# Patient Record
Sex: MALE | Race: WHITE | NOT HISPANIC OR LATINO | Employment: FULL TIME | URBAN - NONMETROPOLITAN AREA
[De-identification: names, ages, dates, MRNs, and addresses within clinical notes are randomized per-mention and may not be internally consistent; named-entity substitution may affect disease eponyms.]

---

## 2017-03-07 RX ORDER — CITALOPRAM 20 MG/1
TABLET ORAL
Qty: 90 TAB | Refills: 0 | Status: SHIPPED | OUTPATIENT
Start: 2017-03-07

## 2017-03-15 DIAGNOSIS — I10 ESSENTIAL HYPERTENSION: ICD-10-CM

## 2017-03-15 DIAGNOSIS — F43.10 PTSD (POST-TRAUMATIC STRESS DISORDER): ICD-10-CM

## 2017-03-15 RX ORDER — PRAZOSIN HYDROCHLORIDE 2 MG/1
CAPSULE ORAL
Qty: 90 CAP | Refills: 0 | Status: SHIPPED | OUTPATIENT
Start: 2017-03-15

## 2019-07-01 ENCOUNTER — OCCUPATIONAL MEDICINE (OUTPATIENT)
Dept: URGENT CARE | Facility: CLINIC | Age: 59
End: 2019-07-01
Payer: COMMERCIAL

## 2019-07-01 VITALS
OXYGEN SATURATION: 94 % | DIASTOLIC BLOOD PRESSURE: 96 MMHG | BODY MASS INDEX: 24.38 KG/M2 | RESPIRATION RATE: 14 BRPM | WEIGHT: 180 LBS | HEART RATE: 86 BPM | SYSTOLIC BLOOD PRESSURE: 154 MMHG | HEIGHT: 72 IN | TEMPERATURE: 97.9 F
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
## 2019-07-01 DIAGNOSIS — M25.561 ACUTE PAIN OF RIGHT KNEE: ICD-10-CM

## 2019-07-01 DIAGNOSIS — S86.911A KNEE STRAIN, RIGHT, INITIAL ENCOUNTER: ICD-10-CM

## 2019-07-01 PROCEDURE — 99213 OFFICE O/P EST LOW 20 MIN: CPT | Mod: 29 | Performed by: PHYSICIAN ASSISTANT

## 2019-07-01 RX ORDER — KETOROLAC TROMETHAMINE 30 MG/ML
60 INJECTION, SOLUTION INTRAMUSCULAR; INTRAVENOUS ONCE
Status: DISCONTINUED | OUTPATIENT
Start: 2019-07-01 | End: 2019-07-01

## 2019-07-01 NOTE — PROGRESS NOTES
Subjective:      Jose Angel Roger is a 58 y.o. male who presents with Knee Injury (right knee)      DOI 6/29/2019: Patient states that he slipped on the step of his truck injuring his right knee.  Patient states he felt a pop and pain.  He states that he tried to drive home and was having extreme pain when trying to push the pedals.  Patient was seen at [facility] emergency room x-rays were done and were negative for any fracture but did see a joint effusion in the right knee.      HPI  Patient presents for follow-up evaluation of work-related injury as described above.  Review of Systems   Musculoskeletal:        Right knee pain         PMH: No pertinent past medical history to this problem  MEDS: Medications were reviewed in Epic  ALLERGIES: Allergies were reviewed in Epic  SOCHX: Works as a  at [employer].   FH: No pertinent family history to this problem       Objective:     /96 (BP Location: Right arm, Patient Position: Sitting)   Pulse 86   Temp 36.6 °C (97.9 °F)   Resp 14   Ht 1.829 m (6')   Wt 81.6 kg (180 lb)   SpO2 94%   BMI 24.41 kg/m²      Physical Exam   Constitutional: Vital signs are normal. He appears well-developed and well-nourished. No distress.   HENT:   Head: Normocephalic and atraumatic.   Right Ear: Hearing normal.   Left Ear: Hearing normal.   Eyes: Pupils are equal, round, and reactive to light.   Cardiovascular: Normal rate, regular rhythm and normal heart sounds.    Pulmonary/Chest: Effort normal.   Musculoskeletal:   Right knee as described below   Neurological: He is alert. Coordination normal.   Skin: Skin is warm and dry.   Psychiatric: He has a normal mood and affect.   Nursing note and vitals reviewed.      Exam is limited secondary to patient's pain.  Patient has tenderness to palpation of the right medial aspect of the knee.  Patient has pain with plantar flexion and against resistance with extension of the right knee.  No joint laxity noted but  there is swelling and effusion in the medial aspect of the patient's right knee.  Negative anterior drawer test.       Assessment/Plan:   1. Knee strain, right, initial encounter  2. Fall, initial encounter  3. Acute pain of right knee  Offered patient Toradol shot today but patient declines and states he just wants to use ibuprofen and pain management as discussed at the emergency room which includes ibuprofen and ice.  Patient also to follow-up with primary care given his elevated blood pressure today.  Patient should avoid any activity involving his right knee including but not limited to driving until next follow-up appointment.  Agree with the ER recommendations of rest, ice, compression with brace and elevation.  Patient will follow-up on Friday, July 5 for reevaluation and possible referral to Ortho at that time if pain persists.  Patient is agreeable to plan.  D 39 completed today  Tom Guevara PA-C

## 2019-07-01 NOTE — LETTER
[facility]  [address] [place] [address]  Phone:  [phone] - Fax:      [facility] Progress Report and Disability Certification  Date of Service: 7/1/2019   No Show:  No  Date / Time of Next Visit: 7/5/2019   Claim Information   Patient Name: Jose Angel Roger  Claim Number:     Employer:    Date of Injury: 6/29/2019     Insurer / TPA: [employer]an[employer] [place]  ID / SSN:     Occupation:   Diagnosis: Diagnoses of Knee strain, right, initial encounter, Fall, initial encounter, and Acute pain of right knee were pertinent to this visit.    Medical Information   Related to Industrial Injury? Yes    Subjective Complaints:  DOI 6/29/2019: Patient states that he slipped on the step of his truck injuring his right knee.  Patient states he felt a pop and pain.  He states that he tried to drive home and was having extreme pain when trying to push the pedals.  Patient was seen at [facility] emergency room x-rays were done and were negative for any fracture but did see a joint effusion in the right knee.  Patient denies any previous knee injury or second job.  Patient reports to the urgent care today for evaluation after being seen at the emergency department this morning.   Objective Findings: Exam is limited secondary to patient's pain.  Patient has tenderness to palpation of the right medial aspect of the knee.  Patient has pain with plantar flexion and against resistance with extension of the right knee.  No joint laxity noted but there is swelling and effusion in the medial aspect of the patient's right knee.  Negative anterior drawer test.   Pre-Existing Condition(s):     Assessment:   Condition Same    Status: Additional Care Required  Permanent Disability:No    Plan:      Diagnostics:      Comments:       Disability Information   Status: Released to Restricted Duty    From:  7/1/2019  Through: 7/5/2019 Restrictions are: Temporary     Physical Restrictions   Sitting:    Standin hrs/day Stooping:    Bending:      Squatting:    Walkin hrs/day Climbing:    Pushing:      Pulling:    Other:    Reaching Above Shoulder (L):   Reaching Above Shoulder (R):       Reaching Below Shoulder (L):    Reaching Below Shoulder (R):      Not to exceed Weight Limits   Carrying(hrs):   Weight Limit(lb):   Lifting(hrs):   Weight  Limit(lb):     Comments: Patient should avoid any activity involving his right knee including but not limited to driving until next follow-up appointment.  Agree with the ER recommendations of rest, ice, compression with brace and elevation.  Patient will follow-up on  for reevaluation and possible referral to Ortho at that time if pain persists.    Repetitive Actions   Hands: i.e. Fine Manipulations from Grasping:     Feet: i.e. Operating Foot Controls:     Driving / Operate Machinery:     Physician Name: Giovanni Guevara P.A.-C. Physician Signature: GIOVANNI Elkins P.A.-C. e-Signature: Dr. Harsh Mir, Medical Director   Clinic Name / Location: [address] [address] Clinic Phone Number: Dept: [phone]   Appointment Time: 1:40 Pm Visit Start Time: 2:17 PM   Check-In Time:  2:10 Pm Visit Discharge Time:  03:01pm   Original-Treating Physician or Chiropractor    Page 2-Insurer/TPA    Page 3-Employer    Page 4-Employee     Yes - the patient is able to be screened

## 2019-07-05 ENCOUNTER — OCCUPATIONAL MEDICINE (OUTPATIENT)
Dept: URGENT CARE | Facility: CLINIC | Age: 59
End: 2019-07-05
Payer: COMMERCIAL

## 2019-07-05 VITALS
OXYGEN SATURATION: 96 % | RESPIRATION RATE: 14 BRPM | SYSTOLIC BLOOD PRESSURE: 132 MMHG | BODY MASS INDEX: 24.38 KG/M2 | WEIGHT: 180 LBS | TEMPERATURE: 98.1 F | HEART RATE: 99 BPM | DIASTOLIC BLOOD PRESSURE: 88 MMHG | HEIGHT: 72 IN

## 2019-07-05 DIAGNOSIS — M25.561 ACUTE PAIN OF RIGHT KNEE: ICD-10-CM

## 2019-07-05 DIAGNOSIS — S83.8X1D SPRAIN OF OTHER LIGAMENT OF RIGHT KNEE, SUBSEQUENT ENCOUNTER: ICD-10-CM

## 2019-07-05 PROCEDURE — 99213 OFFICE O/P EST LOW 20 MIN: CPT | Mod: 29 | Performed by: NURSE PRACTITIONER

## 2019-07-05 RX ORDER — TRAMADOL HYDROCHLORIDE 50 MG/1
50 TABLET ORAL EVERY 6 HOURS PRN
Qty: 20 TAB | Refills: 0 | Status: SHIPPED | OUTPATIENT
Start: 2019-07-05 | End: 2019-07-12

## 2019-07-05 NOTE — LETTER
[facility]  [address] [place] [address]  Phone:  [phone] - Fax:      [facility] Progress Report and Disability Certification  Date of Service: 2019   No Show:  No  Date / Time of Next Visit: 2019   Claim Information   Patient Name: Jose Angel Roger  Claim Number:     Employer:    Date of Injury: 2019     Insurer / TPA: Pranay [place]  ID / SSN:     Occupation:   Diagnosis: Diagnoses of Sprain of other ligament of right knee, subsequent encounter and Acute pain of right knee were pertinent to this visit.    Medical Information   Related to Industrial Injury? Yes    Subjective Complaints:  DOI: 19  Patient slipped on the step of his truck and injured his right knee.  Initially evaluated at the [facility] ER. X-rays were negative for acute fracture, and positive for joint effusion. Patient was then seen in urgent care on , still having significant pain.  Conservative treatment of rest, ice and ibuprofen and restrictions. Today is his third visit.    Objective Findings:     Pre-Existing Condition(s):     Assessment:   Condition Same    Status: Additional Care Required  Permanent Disability:No    Plan: Medication    Diagnostics: MRI    Comments:       Disability Information   Status: Released to Restricted Duty    From:  2019  Through: 2019 Restrictions are: Temporary   Physical Restrictions   Sitting:    Standin hrs/day Stooping:    Bending:      Squattin hrs/day Walkin hrs/day Climbin hrs/day Pushing:      Pulling:    Other:    Reaching Above Shoulder (L):   Reaching Above Shoulder (R):       Reaching Below Shoulder (L):    Reaching Below Shoulder (R):      Not to exceed Weight Limits   Carrying(hrs):   Weight Limit(lb):   Lifting(hrs):   Weight  Limit(lb):     Comments: Continue rest, ibuprofen, ice, elevation. MRI ordered. Referral given to occupational health  for follow up.     Repetitive Actions   Hands: i.e. Fine Manipulations from Grasping:     Feet: i.e. Operating Foot Controls:     Driving / Operate Machinery: 0 hrs/day   Physician Name: Cathey J Hamman, A.P.N. Physician Signature:   e-Signature: Dr. Harsh Mir, Medical Director   Clinic Name / Location: [address] [address] Clinic Phone Number: Dept: [phone]   Appointment Time: 11:00 Am Visit Start Time: 11:15 AM   Check-In Time:  10:59 Am Visit Discharge Time:  12:09 PM   Original-Treating Physician or Chiropractor    Page 2-Insurer/TPA    Page 3-Employer    Page 4-Employee

## 2019-07-05 NOTE — PROGRESS NOTES
Subjective:      Jose Angel Roger is a 58 y.o. male who presents with Knee Pain (right knee)      DOI: 6/29/19  Patient slipped on the step of his truck and injured his right knee.  Initially evaluated at the [facility] ER. X-rays were negative for acute fracture, and positive for joint effusion. Patient was then seen in urgent care on 7/1, still having significant pain.  Conservative treatment of rest, ice and ibuprofen and restrictions. Today is his third visit.      Knee Pain   This is a new problem. Episode onset: 6/29/19. The problem occurs constantly. The problem has been unchanged. Associated symptoms comments: Pain to the medial aspect of the right knee. Nothing aggravates the symptoms. He has tried nothing for the symptoms. The treatment provided no relief.       Review of Systems   Musculoskeletal:        Right knee pain, pain with weight bearing and pain with movement.    All other systems reviewed and are negative.         Objective:     /88 (BP Location: Left arm, Patient Position: Sitting)   Pulse 99   Temp 36.7 °C (98.1 °F)   Resp 14   Ht 1.829 m (6')   Wt 81.6 kg (180 lb)   SpO2 96%   BMI 24.41 kg/m²      Physical Exam   Constitutional: He appears well-developed and well-nourished.   Skin: Skin is dry.   Vitals reviewed.      There is point tenderness to the medial aspect of the right knee.  No obivous laxity of the knee joint. Pain is reproduced with full extension and flexion > 90 degrees.  No deformity. Antalgic gait.             Assessment/Plan:     1. Sprain of other ligament of right knee, subsequent encounter    - MR-KNEE-W/O RIGHT; Future  - REFERRAL TO RADIOLOGY  - tramadol (ULTRAM) 50 MG Tab; Take 1 Tab by mouth every 6 hours as needed for up to 10 days.  Dispense: 20 Tab; Refill: 0  - Consent for Opiate Prescription  -follow up with Dr. Sandhu in occupational health for the next visit; appointment made     2. Acute pain of right knee    - MR-KNEE-W/O RIGHT; Future  -  REFERRAL TO RADIOLOGY  - tramadol (ULTRAM) 50 MG Tab; Take 1 Tab by mouth every 6 hours as needed for up to 10 days.  Dispense: 20 Tab; Refill: 0  - Consent for Opiate Prescription  -follow up with Dr. Sandhu in occupational health for the next visit; appointment made

## 2019-07-11 ENCOUNTER — TELEPHONE (OUTPATIENT)
Dept: OCCUPATIONAL MEDICINE | Facility: CLINIC | Age: 59
End: 2019-07-11

## 2019-07-12 ENCOUNTER — OCCUPATIONAL MEDICINE (OUTPATIENT)
Dept: OCCUPATIONAL MEDICINE | Facility: CLINIC | Age: 59
End: 2019-07-12
Payer: COMMERCIAL

## 2019-07-12 VITALS
BODY MASS INDEX: 25.9 KG/M2 | WEIGHT: 185 LBS | HEIGHT: 71 IN | RESPIRATION RATE: 16 BRPM | TEMPERATURE: 98.6 F | HEART RATE: 112 BPM | OXYGEN SATURATION: 98 % | SYSTOLIC BLOOD PRESSURE: 120 MMHG | DIASTOLIC BLOOD PRESSURE: 84 MMHG

## 2019-07-12 DIAGNOSIS — R29.898 POPPING SOUND OF KNEE JOINT: ICD-10-CM

## 2019-07-12 DIAGNOSIS — S83.91XD SPRAIN OF RIGHT KNEE, SUBSEQUENT ENCOUNTER: ICD-10-CM

## 2019-07-12 PROCEDURE — 99213 OFFICE O/P EST LOW 20 MIN: CPT | Mod: 29 | Performed by: NURSE PRACTITIONER

## 2019-07-12 RX ORDER — TRAMADOL HYDROCHLORIDE 50 MG/1
50 TABLET ORAL EVERY 4 HOURS PRN
Qty: 30 TAB | Refills: 0 | Status: SHIPPED | OUTPATIENT
Start: 2019-07-12 | End: 2019-07-17

## 2019-07-12 RX ORDER — GABAPENTIN 300 MG/1
300 CAPSULE ORAL 3 TIMES DAILY
COMMUNITY

## 2019-07-12 ASSESSMENT — ENCOUNTER SYMPTOMS
MYALGIAS: 1
NEUROLOGICAL NEGATIVE: 1
CONSTITUTIONAL NEGATIVE: 1
RESPIRATORY NEGATIVE: 1

## 2019-07-12 NOTE — PROGRESS NOTES
"Subjective:      Jose Angel Roger is a 58 y.o. male who presents with Other (WC DOI 6/29/19 RT knee, feeling worse, room 16)      DOI: 6/29/19 Patient slipped on the step of his truck and injured his right knee.  Initially evaluated at the [facility] ER. X-rays were negative for acute fracture, and positive for joint effusion. Pt works on a concrete truck. Pt reports that he continues to hear popping in his Right knee every time he uses it. Swelling has gone down. Taking Tramadol and OTC Aleve. Pt states that the pain is a jabbing pain, denies radiating. Pain is 7/10 intermittent when he's elevating the knee. Any pressure applied while going to the bathroom or weight bearing causes throbbing.      Other   Associated symptoms include myalgias.       Review of Systems   Constitutional: Negative.    Respiratory: Negative.    Musculoskeletal: Positive for joint pain and myalgias.   Skin: Negative.    Neurological: Negative.          ROS: All systems were reviewed on intake form, form was reviewed and signed. See scanned documents in media. Pertinent positives and negatives included in HPI.    PMH: No pertinent past medical history to this problem  MEDS: Medications were reviewed in Epic  ALLERGIES:   Allergies   Allergen Reactions   • Lipitor [Atorvastatin] Swelling     Burning swelling of face   • Nkda [No Known Drug Allergy]      SOCHX: Works as  at [employer]   FH: No pertinent family history to this problem       Objective:     /84   Pulse (!) 112   Temp 37 °C (98.6 °F)   Resp 16   Ht 1.803 m (5' 11\")   Wt 83.9 kg (185 lb)   SpO2 98%   BMI 25.80 kg/m²      Physical Exam   Constitutional: He appears well-developed and well-nourished.   Cardiovascular: Normal rate.    Pulmonary/Chest: Effort normal and breath sounds normal.   Musculoskeletal: He exhibits tenderness.   Pain, popping, and decreased ROM at medial aspect of R Knee    Skin: Skin is warm and dry. Capillary refill takes less than " 2 seconds.       Right Knee  Pain to the medial aspect  Guarding  No edema or redness  Drawer test is positive  Popping pos  Neg clicking  Pain with flexion and extension       Assessment/Plan:     1. Sprain of right knee, subsequent encounter  2. Popping sound of knee joint    - REFERRAL TO PHYSICAL THERAPY Reason for Therapy: Eval/Treat/Report  - tramadol (ULTRAM) 50 MG Tab; Take 1 Tab by mouth every four hours as needed for up to 6 doses.  Dispense: 30 Tab; Refill: 0    MRI pending  Follow-up in 2 weeks   Continue with OTC Aleve as needed for pain   Apply heat and ice as tolerated   Work Restrictions applied

## 2019-07-12 NOTE — LETTER
[address],   [address] [address]  Phone:  [phone] - Fax:  [phone]   [facility] Progress Report and Disability Certification  Date of Service: 7/12/2019   No Show:  No  Date / Time of Next Visit: 7/26/2019 @ 11:30AM   Claim Information   Patient Name: Jose Angel Roger  Claim Number:     Employer:   [employer] Date of Injury:      Insurer / TPA: Pranay [place]  ID / SSN:     Occupation:   Diagnosis: Diagnoses of Sprain of right knee, subsequent encounter and Popping sound of knee joint were pertinent to this visit.    Medical Information   Related to Industrial Injury? Yes    Subjective Complaints:  DOI: 6/29/19 Patient slipped on the step of his truck and injured his right knee.  Initially evaluated at the [facility] ER. X-rays were negative for acute fracture, and positive for joint effusion. Pt works on a concrete truck. Pt reports that he continues to hear popping in his Right knee every time he uses it. Swelling has gone down. Taking Tramadol and OTC Aleve. Pt states that the pain is a jabbing pain, denies radiating. Pain is 7/10 intermittent when he's elevating the knee. Any pressure applied while going to the bathroom or weight bearing causes throbbing. Wearing a knee sleeve, that helps with swelling and pain during the day. Has difficulty driving   Objective Findings: Right Knee  Pain to the medial aspect  Guarding  No edema or redness  Drawer test is positive  Popping pos  Neg clicking  Pain with flexion and extension   Pre-Existing Condition(s):     Assessment:   Condition Same    Status: Additional Care Required  Permanent Disability:No    Plan:      Diagnostics:      Comments:  Follow-up in 2 weeks  PT referral  Work Restrictions applied   Take OTC Aleve as needed for pain  Apply heat and ice as tolerated  Use Tramadol 50mg as needed for pain    Disability Information   Status:  Released to Restricted Duty    From:  2019  Through: 2019 Restrictions are:     Physical Restrictions   Sitting:    Standing:  < or = to 1 hr/day Stooping:    Bending:      Squattin hrs/day Walking:  < or = to 1 hr/day Climbing:  < or = to 1 hr/day Pushing:      Pulling:    Other:    Reaching Above Shoulder (L):   Reaching Above Shoulder (R):       Reaching Below Shoulder (L):    Reaching Below Shoulder (R):      Not to exceed Weight Limits   Carrying(hrs):   Weight Limit(lb): < or = to 10 pounds Lifting(hrs):   Weight  Limit(lb): < or = to 10 pounds   Comments:      Repetitive Actions   Hands: i.e. Fine Manipulations from Grasping:     Feet: i.e. Operating Foot Controls:     Driving / Operate Machinery: < or = to 1 hr/day   Physician Name: Charles Sandhu D.O. Physician Signature: BIENVENIDO Vargas e-Signature: Dr. Harsh Mir, Medical Director   Clinic Name / Location: [address],   [address] [place] [address] Clinic Phone Number: Dept: [phone]   Appointment Time: 1:00 Pm Visit Start Time: 12:48 PM   Check-In Time:  12:28 Pm Visit Discharge Time: 1:41 PM   Original-Treating Physician or Chiropractor    Page 2-Insurer/TPA    Page 3-Employer    Page 4-Employee

## 2019-07-25 ENCOUNTER — TELEPHONE (OUTPATIENT)
Dept: OCCUPATIONAL MEDICINE | Facility: CLINIC | Age: 59
End: 2019-07-25

## 2019-07-26 ENCOUNTER — OCCUPATIONAL MEDICINE (OUTPATIENT)
Dept: OCCUPATIONAL MEDICINE | Facility: CLINIC | Age: 59
End: 2019-07-26
Payer: COMMERCIAL

## 2019-07-26 VITALS
HEIGHT: 72 IN | BODY MASS INDEX: 25.19 KG/M2 | OXYGEN SATURATION: 94 % | DIASTOLIC BLOOD PRESSURE: 68 MMHG | TEMPERATURE: 98.4 F | WEIGHT: 186 LBS | HEART RATE: 77 BPM | SYSTOLIC BLOOD PRESSURE: 118 MMHG

## 2019-07-26 DIAGNOSIS — S83.241D ACUTE MEDIAL MENISCUS TEAR OF RIGHT KNEE, SUBSEQUENT ENCOUNTER: ICD-10-CM

## 2019-07-26 DIAGNOSIS — R29.898 POPPING SOUND OF KNEE JOINT: ICD-10-CM

## 2019-07-26 DIAGNOSIS — S83.91XD SPRAIN OF RIGHT KNEE, SUBSEQUENT ENCOUNTER: ICD-10-CM

## 2019-07-26 PROCEDURE — 99213 OFFICE O/P EST LOW 20 MIN: CPT | Mod: 29 | Performed by: NURSE PRACTITIONER

## 2019-07-26 ASSESSMENT — ENCOUNTER SYMPTOMS
CONSTITUTIONAL NEGATIVE: 1
CARDIOVASCULAR NEGATIVE: 1
BACK PAIN: 1
NEUROLOGICAL NEGATIVE: 1
MYALGIAS: 1
RESPIRATORY NEGATIVE: 1

## 2019-07-26 NOTE — PROGRESS NOTES
Subjective:      Jose Angel Roger is a 58 y.o. male who presents with Other (WC DOI 6/29/19 RT knee, feeling the same, room 16)      DOI: 6/29/19 Patient slipped on the step of his truck and injured his right knee.  Initially evaluated at the [facility] ER. X-rays were negative for acute fracture, and positive for joint effusion.  Pt states there is no improvement. MRI results reviewed. Taking the Tramadol and Advil. Pt is compensating and now back is starting to hurt. Pt is resting his knee as much as possible. Pt is continuing to use his brace. Discussed plan of care. Pt agrees with plan of care at this time.     HPI    Review of Systems   Constitutional: Negative.    Respiratory: Negative.    Cardiovascular: Negative.    Musculoskeletal: Positive for back pain, joint pain and myalgias.   Skin: Negative.    Neurological: Negative.         SOCHX: Works as a  at  : No pertinent family history to this problem.         Objective:     /68   Pulse 77   Temp 36.9 °C (98.4 °F)   Ht 1.829 m (6')   Wt 84.4 kg (186 lb)   SpO2 94%   BMI 25.23 kg/m²      Physical Exam   Constitutional: He is oriented to person, place, and time. He appears well-developed and well-nourished.   Cardiovascular: Normal rate and regular rhythm.    Pulmonary/Chest: Effort normal.   Musculoskeletal: He exhibits edema and tenderness.        Right knee: He exhibits decreased range of motion, swelling and bony tenderness. Tenderness found. Medial joint line tenderness noted.   Neurological: He is alert and oriented to person, place, and time.   Skin: Skin is warm and dry. Capillary refill takes less than 2 seconds.   Psychiatric: He has a normal mood and affect.       Right Knee  Moderate pain to the medial aspect  Guarding  No edema or redness  Drawer test is positive  Popping pos  Neg clicking  Severe pain with flexion and extension    MRI 7/20/19:  Impression- Complex tearing of the body and posterior horn of the medial  meniscus with disruption of the posterior root ligament attachment. Moderate partial thickness medial compartment chondromalacia       Assessment/Plan:     1. Sprain of right knee, subsequent encounter    2. Popping sound of knee joint  3. Acute medial meniscus tear of right knee, subsequent encounter    - REFERRAL TO GENERAL SURGERY    Follow-up in 3 weeks   Keep PT appointment  Referral to Orthopedics sent  Surgery referral sent   Work Restrictions applied   Continue with OTC Aleve as needed for pain   Apply heat and ice as tolerated   Use Tramadol 50mg as needed for pain

## 2019-07-26 NOTE — LETTER
[address],   [address] [address]  Phone:  [phone] - Fax:  [phone]   [facility] Progress Report and Disability Certification  Date of Service: 7/26/2019   No Show:  No  Date / Time of Next Visit: 8/16/2019 @ 1pm   Claim Information   Patient Name: Jose Angel Roger  Claim Number:     Employer:   [employer] Date of Injury: 6/29/2019     Insurer / TPA: Pranay [place]  ID / SSN:     Occupation:   Diagnosis: Diagnoses of Sprain of right knee, subsequent encounter, Popping sound of knee joint, and Acute medial meniscus tear of right knee, subsequent encounter were pertinent to this visit.    Medical Information   Related to Industrial Injury? Yes    Subjective Complaints:  DOI: 6/29/19 Patient slipped on the step of his truck and injured his right knee.  Initially evaluated at the [facility] ER. X-rays were negative for acute fracture, and positive for joint effusion.  Pt states there is no improvement. MRI results reviewed. Taking the Tramadol and Advil. Pt is compensating and now back is starting to hurt. Pt is resting his knee as much as possible. Pt is continuing to use his brace.    Objective Findings: Right Knee  Moderate pain to the medial aspect  Guarding  No edema or redness  Drawer test is positive  Popping pos  Neg clicking  Severe pain with flexion and extension   Pre-Existing Condition(s):     Assessment:   Condition Same    Status: Additional Care Required  Permanent Disability:No    Plan:      Diagnostics:      Comments:  Follow-up in 3 weeks   PT referral pending   Surgery referral sent  Work Restrictions applied   Continue with OTC Aleve as needed for pain   Apply heat and ice as tolerated   Use Tramadol 50mg as needed for pain       Disability Information   Status: Released to Restricted Duty    From:  7/26/2019  Through: 8/16/2019 Restrictions are: Temporary   Physical Restrictions      Sitting:    Standing:  < or = to 1 hr/day Stooping:    Bending:      Squattin hrs/day Walking:  < or = to 1 hr/day Climbing:  < or = to 1 hr/day Pushing:      Pulling:    Other:    Reaching Above Shoulder (L):   Reaching Above Shoulder (R):       Reaching Below Shoulder (L):    Reaching Below Shoulder (R):      Not to exceed Weight Limits   Carrying(hrs):   Weight Limit(lb):   Comments:No lifting more than 5lbs Lifting(hrs):   Weight  Limit(lb):   Comments:No lifting more than 5lbs   Comments:      Repetitive Actions   Hands: i.e. Fine Manipulations from Grasping:     Feet: i.e. Operating Foot Controls: 0 hrs/day   Driving / Operate Machinery: 0 hrs/day   Physician Name: JM Meeks Physician Signature:   e-Signature: Dr. Harsh Mir, Medical Director   Clinic Name / Location: [address],   [address] [place] [address] Clinic Phone Number: Dept: [phone]   Appointment Time: 11:30 Am Visit Start Time: 11:22 AM   Check-In Time:  11:16 Am Visit Discharge Time:  12:30pm   Original-Treating Physician or Chiropractor    Page 2-Insurer/TPA    Page 3-Employer    Page 4-Employee

## 2019-08-01 ENCOUNTER — TELEPHONE (OUTPATIENT)
Dept: URGENT CARE | Facility: CLINIC | Age: 59
End: 2019-08-01

## 2019-08-01 NOTE — TELEPHONE ENCOUNTER
Patient had called asking about his referral of his hand surgery. He did stat he had multiple missed calls from a [facility] number but no VMs to call someone back. I went ahead and gave him the number to call where the referral went. He was happy for the help and had no further questions.

## 2019-08-15 ENCOUNTER — TELEPHONE (OUTPATIENT)
Dept: OCCUPATIONAL MEDICINE | Facility: CLINIC | Age: 59
End: 2019-08-15

## 2020-01-23 ENCOUNTER — OFFICE VISIT (OUTPATIENT)
Dept: OCCUPATIONAL MEDICINE | Facility: CLINIC | Age: 60
End: 2020-01-23

## 2020-01-23 VITALS
TEMPERATURE: 98.1 F | WEIGHT: 180 LBS | OXYGEN SATURATION: 96 % | BODY MASS INDEX: 24.38 KG/M2 | DIASTOLIC BLOOD PRESSURE: 86 MMHG | HEIGHT: 72 IN | SYSTOLIC BLOOD PRESSURE: 144 MMHG | HEART RATE: 86 BPM | RESPIRATION RATE: 16 BRPM

## 2020-01-23 DIAGNOSIS — Z02.4 ENCOUNTER FOR COMMERCIAL DRIVER MEDICAL EXAMINATION (CDME): ICD-10-CM

## 2020-01-23 PROCEDURE — 7100 PR DOT PHYSICAL: Performed by: PREVENTIVE MEDICINE

## 2020-07-24 ENCOUNTER — HOSPITAL ENCOUNTER (OUTPATIENT)
Dept: LAB | Facility: MEDICAL CENTER | Age: 60
End: 2020-07-24
Attending: FAMILY MEDICINE
Payer: COMMERCIAL

## 2020-07-24 LAB
25(OH)D3 SERPL-MCNC: 24 NG/ML (ref 30–100)
ALBUMIN SERPL BCP-MCNC: 4.7 G/DL (ref 3.2–4.9)
ALBUMIN/GLOB SERPL: 2 G/DL
ALP SERPL-CCNC: 68 U/L (ref 30–99)
ALT SERPL-CCNC: 12 U/L (ref 2–50)
ANION GAP SERPL CALC-SCNC: 15 MMOL/L (ref 7–16)
AST SERPL-CCNC: 22 U/L (ref 12–45)
BASOPHILS # BLD AUTO: 0.7 % (ref 0–1.8)
BASOPHILS # BLD: 0.05 K/UL (ref 0–0.12)
BILIRUB SERPL-MCNC: 0.3 MG/DL (ref 0.1–1.5)
BUN SERPL-MCNC: 21 MG/DL (ref 8–22)
CALCIUM SERPL-MCNC: 9.2 MG/DL (ref 8.5–10.5)
CHLORIDE SERPL-SCNC: 105 MMOL/L (ref 96–112)
CHOLEST SERPL-MCNC: 201 MG/DL (ref 100–199)
CO2 SERPL-SCNC: 21 MMOL/L (ref 20–33)
CREAT SERPL-MCNC: 1.01 MG/DL (ref 0.5–1.4)
EOSINOPHIL # BLD AUTO: 0.05 K/UL (ref 0–0.51)
EOSINOPHIL NFR BLD: 0.7 % (ref 0–6.9)
ERYTHROCYTE [DISTWIDTH] IN BLOOD BY AUTOMATED COUNT: 41.7 FL (ref 35.9–50)
EST. AVERAGE GLUCOSE BLD GHB EST-MCNC: 114 MG/DL
GGT SERPL-CCNC: 31 U/L (ref 7–51)
GLOBULIN SER CALC-MCNC: 2.3 G/DL (ref 1.9–3.5)
GLUCOSE SERPL-MCNC: 118 MG/DL (ref 65–99)
HBA1C MFR BLD: 5.6 % (ref 0–5.6)
HCT VFR BLD AUTO: 43.2 % (ref 42–52)
HDLC SERPL-MCNC: 55 MG/DL
HGB BLD-MCNC: 14.4 G/DL (ref 14–18)
IMM GRANULOCYTES # BLD AUTO: 0.02 K/UL (ref 0–0.11)
IMM GRANULOCYTES NFR BLD AUTO: 0.3 % (ref 0–0.9)
LDLC SERPL CALC-MCNC: 115 MG/DL
LYMPHOCYTES # BLD AUTO: 0.99 K/UL (ref 1–4.8)
LYMPHOCYTES NFR BLD: 13.7 % (ref 22–41)
MCH RBC QN AUTO: 31.9 PG (ref 27–33)
MCHC RBC AUTO-ENTMCNC: 33.3 G/DL (ref 33.7–35.3)
MCV RBC AUTO: 95.8 FL (ref 81.4–97.8)
MONOCYTES # BLD AUTO: 0.43 K/UL (ref 0–0.85)
MONOCYTES NFR BLD AUTO: 5.9 % (ref 0–13.4)
NEUTROPHILS # BLD AUTO: 5.69 K/UL (ref 1.82–7.42)
NEUTROPHILS NFR BLD: 78.7 % (ref 44–72)
NRBC # BLD AUTO: 0 K/UL
NRBC BLD-RTO: 0 /100 WBC
PLATELET # BLD AUTO: 249 K/UL (ref 164–446)
PMV BLD AUTO: 10.6 FL (ref 9–12.9)
POTASSIUM SERPL-SCNC: 4.4 MMOL/L (ref 3.6–5.5)
PROT SERPL-MCNC: 7 G/DL (ref 6–8.2)
RBC # BLD AUTO: 4.51 M/UL (ref 4.7–6.1)
SODIUM SERPL-SCNC: 141 MMOL/L (ref 135–145)
T4 FREE SERPL-MCNC: 0.85 NG/DL (ref 0.93–1.7)
TRIGL SERPL-MCNC: 154 MG/DL (ref 0–149)
TSH SERPL DL<=0.005 MIU/L-ACNC: 1.55 UIU/ML (ref 0.38–5.33)
VIT B12 SERPL-MCNC: 248 PG/ML (ref 211–911)
WBC # BLD AUTO: 7.2 K/UL (ref 4.8–10.8)

## 2020-07-24 PROCEDURE — 85025 COMPLETE CBC W/AUTO DIFF WBC: CPT

## 2020-07-24 PROCEDURE — 84439 ASSAY OF FREE THYROXINE: CPT

## 2020-07-24 PROCEDURE — 82977 ASSAY OF GGT: CPT

## 2020-07-24 PROCEDURE — 36415 COLL VENOUS BLD VENIPUNCTURE: CPT

## 2020-07-24 PROCEDURE — 83036 HEMOGLOBIN GLYCOSYLATED A1C: CPT

## 2020-07-24 PROCEDURE — 80053 COMPREHEN METABOLIC PANEL: CPT

## 2020-07-24 PROCEDURE — 82607 VITAMIN B-12: CPT

## 2020-07-24 PROCEDURE — 80061 LIPID PANEL: CPT

## 2020-07-24 PROCEDURE — 84443 ASSAY THYROID STIM HORMONE: CPT

## 2020-07-24 PROCEDURE — 82306 VITAMIN D 25 HYDROXY: CPT

## 2021-03-15 DIAGNOSIS — Z23 NEED FOR VACCINATION: ICD-10-CM

## 2021-04-17 ENCOUNTER — HOSPITAL ENCOUNTER (EMERGENCY)
Facility: MEDICAL CENTER | Age: 61
End: 2021-04-17
Attending: EMERGENCY MEDICINE
Payer: COMMERCIAL

## 2021-04-17 ENCOUNTER — APPOINTMENT (OUTPATIENT)
Dept: RADIOLOGY | Facility: MEDICAL CENTER | Age: 61
End: 2021-04-17
Attending: EMERGENCY MEDICINE
Payer: COMMERCIAL

## 2021-04-17 VITALS
TEMPERATURE: 97.8 F | BODY MASS INDEX: 22.46 KG/M2 | SYSTOLIC BLOOD PRESSURE: 117 MMHG | HEIGHT: 74 IN | RESPIRATION RATE: 18 BRPM | HEART RATE: 87 BPM | OXYGEN SATURATION: 92 % | DIASTOLIC BLOOD PRESSURE: 80 MMHG | WEIGHT: 175 LBS

## 2021-04-17 DIAGNOSIS — F41.9 ANXIETY: ICD-10-CM

## 2021-04-17 DIAGNOSIS — E86.0 DEHYDRATION: ICD-10-CM

## 2021-04-17 DIAGNOSIS — F10.29 ALCOHOL DEPENDENCE WITH UNSPECIFIED ALCOHOL-INDUCED DISORDER (HCC): ICD-10-CM

## 2021-04-17 DIAGNOSIS — N17.9 AKI (ACUTE KIDNEY INJURY) (HCC): ICD-10-CM

## 2021-04-17 LAB
ALBUMIN SERPL BCP-MCNC: 4.4 G/DL (ref 3.2–4.9)
ALBUMIN/GLOB SERPL: 1.8 G/DL
ALP SERPL-CCNC: 63 U/L (ref 30–99)
ALT SERPL-CCNC: 19 U/L (ref 2–50)
ANION GAP SERPL CALC-SCNC: 14 MMOL/L (ref 7–16)
AST SERPL-CCNC: 23 U/L (ref 12–45)
BASOPHILS # BLD AUTO: 0.4 % (ref 0–1.8)
BASOPHILS # BLD: 0.03 K/UL (ref 0–0.12)
BILIRUB SERPL-MCNC: 0.6 MG/DL (ref 0.1–1.5)
BUN SERPL-MCNC: 37 MG/DL (ref 8–22)
CALCIUM SERPL-MCNC: 9 MG/DL (ref 8.5–10.5)
CHLORIDE SERPL-SCNC: 105 MMOL/L (ref 96–112)
CO2 SERPL-SCNC: 19 MMOL/L (ref 20–33)
CREAT SERPL-MCNC: 1.7 MG/DL (ref 0.5–1.4)
EOSINOPHIL # BLD AUTO: 0.05 K/UL (ref 0–0.51)
EOSINOPHIL NFR BLD: 0.6 % (ref 0–6.9)
ERYTHROCYTE [DISTWIDTH] IN BLOOD BY AUTOMATED COUNT: 43.2 FL (ref 35.9–50)
GLOBULIN SER CALC-MCNC: 2.5 G/DL (ref 1.9–3.5)
GLUCOSE SERPL-MCNC: 182 MG/DL (ref 65–99)
HCT VFR BLD AUTO: 41.5 % (ref 42–52)
HGB BLD-MCNC: 14.3 G/DL (ref 14–18)
IMM GRANULOCYTES # BLD AUTO: 0.02 K/UL (ref 0–0.11)
IMM GRANULOCYTES NFR BLD AUTO: 0.3 % (ref 0–0.9)
LIPASE SERPL-CCNC: 42 U/L (ref 11–82)
LYMPHOCYTES # BLD AUTO: 0.96 K/UL (ref 1–4.8)
LYMPHOCYTES NFR BLD: 12.3 % (ref 22–41)
MAGNESIUM SERPL-MCNC: 1.8 MG/DL (ref 1.5–2.5)
MCH RBC QN AUTO: 34 PG (ref 27–33)
MCHC RBC AUTO-ENTMCNC: 34.5 G/DL (ref 33.7–35.3)
MCV RBC AUTO: 98.6 FL (ref 81.4–97.8)
MONOCYTES # BLD AUTO: 0.6 K/UL (ref 0–0.85)
MONOCYTES NFR BLD AUTO: 7.7 % (ref 0–13.4)
NEUTROPHILS # BLD AUTO: 6.15 K/UL (ref 1.82–7.42)
NEUTROPHILS NFR BLD: 78.7 % (ref 44–72)
NRBC # BLD AUTO: 0 K/UL
NRBC BLD-RTO: 0 /100 WBC
PHOSPHATE SERPL-MCNC: 3.2 MG/DL (ref 2.5–4.5)
PLATELET # BLD AUTO: 187 K/UL (ref 164–446)
PMV BLD AUTO: 10.8 FL (ref 9–12.9)
POTASSIUM SERPL-SCNC: 3.9 MMOL/L (ref 3.6–5.5)
PROT SERPL-MCNC: 6.9 G/DL (ref 6–8.2)
RBC # BLD AUTO: 4.21 M/UL (ref 4.7–6.1)
SODIUM SERPL-SCNC: 138 MMOL/L (ref 135–145)
WBC # BLD AUTO: 7.8 K/UL (ref 4.8–10.8)

## 2021-04-17 PROCEDURE — 96374 THER/PROPH/DIAG INJ IV PUSH: CPT

## 2021-04-17 PROCEDURE — 83735 ASSAY OF MAGNESIUM: CPT

## 2021-04-17 PROCEDURE — A9270 NON-COVERED ITEM OR SERVICE: HCPCS | Performed by: EMERGENCY MEDICINE

## 2021-04-17 PROCEDURE — 84100 ASSAY OF PHOSPHORUS: CPT

## 2021-04-17 PROCEDURE — 99285 EMERGENCY DEPT VISIT HI MDM: CPT

## 2021-04-17 PROCEDURE — 700102 HCHG RX REV CODE 250 W/ 637 OVERRIDE(OP): Performed by: EMERGENCY MEDICINE

## 2021-04-17 PROCEDURE — 76705 ECHO EXAM OF ABDOMEN: CPT

## 2021-04-17 PROCEDURE — 83690 ASSAY OF LIPASE: CPT

## 2021-04-17 PROCEDURE — 85025 COMPLETE CBC W/AUTO DIFF WBC: CPT

## 2021-04-17 PROCEDURE — 700111 HCHG RX REV CODE 636 W/ 250 OVERRIDE (IP): Performed by: EMERGENCY MEDICINE

## 2021-04-17 PROCEDURE — 80053 COMPREHEN METABOLIC PANEL: CPT

## 2021-04-17 RX ORDER — LORAZEPAM 2 MG/ML
1 INJECTION INTRAMUSCULAR ONCE
Status: COMPLETED | OUTPATIENT
Start: 2021-04-17 | End: 2021-04-17

## 2021-04-17 RX ADMIN — LIDOCAINE HYDROCHLORIDE 30 ML: 20 SOLUTION OROPHARYNGEAL at 16:39

## 2021-04-17 RX ADMIN — LORAZEPAM 1 MG: 2 INJECTION INTRAMUSCULAR; INTRAVENOUS at 16:40

## 2021-04-17 ASSESSMENT — LIFESTYLE VARIABLES
CONSUMPTION TOTAL: INCOMPLETE
TOTAL SCORE: 4
ON A TYPICAL DAY WHEN YOU DRINK ALCOHOL HOW MANY DRINKS DO YOU HAVE: 4
HAVE YOU EVER FELT YOU SHOULD CUT DOWN ON YOUR DRINKING: YES
TOTAL SCORE: 4
EVER FELT BAD OR GUILTY ABOUT YOUR DRINKING: YES
DO YOU DRINK ALCOHOL: YES
TOTAL SCORE: 4
EVER HAD A DRINK FIRST THING IN THE MORNING TO STEADY YOUR NERVES TO GET RID OF A HANGOVER: YES
AVERAGE NUMBER OF DAYS PER WEEK YOU HAVE A DRINK CONTAINING ALCOHOL: 7
HAVE PEOPLE ANNOYED YOU BY CRITICIZING YOUR DRINKING: YES

## 2021-04-17 ASSESSMENT — FIBROSIS 4 INDEX: FIB4 SCORE: 1.58
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
## 2021-04-17 NOTE — ED TRIAGE NOTES
BIB [facility] to [facility].  Pt reports recent life stressor. 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  Pt attributes some of his anxiety to drinking 1 pint per day daily x 6 months.      Pt also reports intermittent abd pain x 3 months in which he takes Omeprazole for.      Pt in gown, on monitor, ERP to the bedside.

## 2021-06-24 PROBLEM — M50.30 DDD (DEGENERATIVE DISC DISEASE), CERVICAL: Status: ACTIVE | Noted: 2021-06-24

## 2021-06-24 PROBLEM — M51.36 LUMBAR DEGENERATIVE DISC DISEASE: Status: ACTIVE | Noted: 2021-06-24

## 2021-06-24 PROBLEM — M54.16 LUMBAR RADICULITIS: Status: ACTIVE | Noted: 2021-06-24

## 2021-06-24 PROBLEM — M54.12 CERVICAL RADICULITIS: Status: ACTIVE | Noted: 2021-06-24

## 2021-06-24 PROBLEM — M79.10 MYALGIA: Status: ACTIVE | Noted: 2021-06-24
